# Patient Record
Sex: FEMALE | Race: WHITE | Employment: FULL TIME | URBAN - METROPOLITAN AREA
[De-identification: names, ages, dates, MRNs, and addresses within clinical notes are randomized per-mention and may not be internally consistent; named-entity substitution may affect disease eponyms.]

---

## 2017-08-18 ENCOUNTER — HOSPITAL ENCOUNTER (OUTPATIENT)
Dept: MAMMOGRAPHY | Age: 57
Discharge: HOME | End: 2017-08-18
Attending: OBSTETRICS & GYNECOLOGY
Payer: COMMERCIAL

## 2017-08-18 DIAGNOSIS — Z12.31 VISIT FOR SCREENING MAMMOGRAM: ICD-10-CM

## 2017-08-18 PROCEDURE — 77067 SCR MAMMO BI INCL CAD: CPT

## 2019-01-15 ENCOUNTER — HOSPITAL ENCOUNTER (OUTPATIENT)
Dept: MAMMOGRAPHY | Age: 59
Discharge: HOME | End: 2019-01-15
Attending: OBSTETRICS & GYNECOLOGY
Payer: COMMERCIAL

## 2019-01-15 DIAGNOSIS — Z12.39 SCREENING BREAST EXAMINATION: ICD-10-CM

## 2019-01-15 PROCEDURE — 77063 BREAST TOMOSYNTHESIS BI: CPT

## 2019-07-25 LAB — CREATININE, EXTERNAL: 0.66

## 2019-11-05 ENCOUNTER — OFFICE VISIT (OUTPATIENT)
Dept: ENDOCRINOLOGY | Age: 59
End: 2019-11-05

## 2019-11-05 VITALS
WEIGHT: 191.6 LBS | HEART RATE: 71 BPM | BODY MASS INDEX: 32.71 KG/M2 | HEIGHT: 64 IN | SYSTOLIC BLOOD PRESSURE: 120 MMHG | DIASTOLIC BLOOD PRESSURE: 74 MMHG

## 2019-11-05 DIAGNOSIS — E03.9 HYPOTHYROIDISM, UNSPECIFIED TYPE: Primary | ICD-10-CM

## 2019-11-05 RX ORDER — SUMATRIPTAN 50 MG/1
50 TABLET, FILM COATED ORAL
COMMUNITY

## 2019-11-05 RX ORDER — CHOLECALCIFEROL (VITAMIN D3) 125 MCG
5000 CAPSULE ORAL
COMMUNITY

## 2019-11-05 RX ORDER — SULFAMETHOXAZOLE AND TRIMETHOPRIM 800; 160 MG/1; MG/1
1 TABLET ORAL DAILY
Refills: 0 | COMMUNITY
Start: 2019-10-04

## 2019-11-05 RX ORDER — FLUTICASONE PROPIONATE 50 MCG
1 SPRAY, SUSPENSION (ML) NASAL
COMMUNITY

## 2019-11-05 RX ORDER — EMOLLIENT COMBINATION NO.43
CREAM (GRAM) TOPICAL
Refills: 0 | COMMUNITY
Start: 2019-09-11

## 2019-11-05 RX ORDER — ALPRAZOLAM 0.25 MG/1
0.25 TABLET ORAL
COMMUNITY

## 2019-11-05 RX ORDER — DICYCLOMINE HYDROCHLORIDE 10 MG/1
1 CAPSULE ORAL
COMMUNITY

## 2019-11-05 RX ORDER — LORATADINE 10 MG/1
10 TABLET ORAL
COMMUNITY

## 2019-11-05 RX ORDER — ESCITALOPRAM OXALATE 20 MG/1
1 TABLET ORAL DAILY
Refills: 0 | COMMUNITY
Start: 2019-09-03

## 2019-11-05 NOTE — PROGRESS NOTES
CONSULTATION REQUESTED BY: Celestina Kimbrough NP     REASON FOR CONSULT: Hypothyroidism    CHIEF COMPLAINT: Evaluation for hypothyroidism    HISTORY OF PRESENT ILLNESS:   Marlon Luu is a 64 y.o. female with a PMHx as noted below who was referred to our endocrinology clinic for evaluation of hypothyroidism. Thyroid History:  Diagnosed just over 5 years ago,  Patient denies history of radiation exposure or trauma/surgery,  Family history of thyroid disease is +,   Currently taking armour thyroid 60mg once daily,   The patient admits to taking it together with her other supplements/meds in the AM,  Denies history of osteoporosis or heart issues,  Patient reports occasional palpitations. Steady weight,    Ultrasound: 9/19/18:   Right lobe 4.1 cm  Left lobe 3.9 cm  subcentimeter nodules (side not described)  \"no nodules that warrant FNA or ultrasound follow up per ACR TI-RADS\",     Outside labs reviewed: 7/25/19:   TSH 1.9  FT4 0.66  On 60mg armour thyroid daily    PAST MEDICAL/SURGICAL HISTORY:   No past medical history on file. Past Surgical History:   Procedure Laterality Date    HX BREAST BIOPSY Right     benign cyst removed in the 80s or 90s       ALLERGIES:   Not on File    MEDICATIONS ON ADMISSION:     Current Outpatient Medications:     ARMOUR THYROID 60 mg tablet, Take 1 Tab by mouth daily. , Disp: , Rfl: 0    escitalopram oxalate (LEXAPRO) 20 mg tablet, Take 1 Tab by mouth daily. , Disp: , Rfl: 0    cholecalciferol (VITAMIN D3) 5,000 unit capsule, Take 5,000 Units by mouth., Disp: , Rfl:     loratadine (CLARITIN) 10 mg tablet, Take 10 mg by mouth., Disp: , Rfl:     fluticasone propionate (FLONASE) 50 mcg/actuation nasal spray, 1 Parks by Nasal route., Disp: , Rfl:     Lactobacillus acidophilus 10 billion cell cap, Take 1 Cap by mouth daily. , Disp: , Rfl:     ALPRAZolam (XANAX) 0.25 mg tablet, Take 0.25 mg by mouth., Disp: , Rfl:     dicyclomine (BENTYL) 10 mg capsule, Take 1 Tab by mouth., Disp: , Rfl:     SUMAtriptan (IMITREX) 50 mg tablet, Take 50 mg by mouth., Disp: , Rfl:     PROMISEB crea, apply as directed TO PERINASAL AREA TWICE DAILY, Disp: , Rfl: 0    MELATONIN PO, Take 1 Tab by mouth daily. , Disp: , Rfl:     SOCIAL HISTORY:   Social History     Socioeconomic History    Marital status:      Spouse name: Not on file    Number of children: Not on file    Years of education: Not on file    Highest education level: Not on file   Occupational History    Not on file   Social Needs    Financial resource strain: Not on file    Food insecurity:     Worry: Not on file     Inability: Not on file    Transportation needs:     Medical: Not on file     Non-medical: Not on file   Tobacco Use    Smoking status: Never Smoker    Smokeless tobacco: Never Used   Substance and Sexual Activity    Alcohol use: Not on file    Drug use: Not on file    Sexual activity: Not on file   Lifestyle    Physical activity:     Days per week: Not on file     Minutes per session: Not on file    Stress: Not on file   Relationships    Social connections:     Talks on phone: Not on file     Gets together: Not on file     Attends Holiness service: Not on file     Active member of club or organization: Not on file     Attends meetings of clubs or organizations: Not on file     Relationship status: Not on file    Intimate partner violence:     Fear of current or ex partner: Not on file     Emotionally abused: Not on file     Physically abused: Not on file     Forced sexual activity: Not on file   Other Topics Concern    Not on file   Social History Narrative    Not on file       FAMILY HISTORY:  Family History   Problem Relation Age of Onset    Breast Cancer Paternal Aunt 48       REVIEW OF SYSTEMS: Complete ROS assessed and noted for that which is described above, all else are negative.   Eyes: normal  ENT: normal  CVS: normal  Resp: normal  GI: normal  : normal  GYN: normal  Endocrine: normal  Integument: normal  Musculoskeletal: normal  Neuro: normal  Psych: normal      PHYSICAL EXAMINATION:    VITAL SIGNS:  Visit Vitals  /74 (BP 1 Location: Left arm, BP Patient Position: Sitting)   Pulse 71   Ht 5' 4\" (1.626 m)   Wt 191 lb 9.6 oz (86.9 kg)   BMI 32.89 kg/m²       GENERAL: NCAT, Sitting comfortably, NAD  EYES: EOMI, non-icteric, no proptosis  Ear/Nose/Throat: NCAT, no inflammation, no masses, thyroid gland is not enlarged  LYMPH NODES: No LAD  CARDIOVASCULAR: S1 S2, RRR, No murmur, 2+ radial pulses  RESPIRATORY: CTA b/l, no wheeze/rales  GASTROINTESTINAL: ND  MUSCULOSKELETAL: Normal ROM, no atrophy  SKIN: warm, no edema/rash/ or other skin changes  NEUROLOGIC: 5/5 power all extremities, no tremor, AAOx3  PSYCHIATRIC: Normal affect, Normal insight and judgement     REVIEW OF LABORATORY AND RADIOLOGY DATA:   Labs and documentation have been reviewed as described above. ASSESSMENT AND PLAN:   Frankey Fanny is a 64 y.o. female with a PMHx as noted above who was referred to our endocrinology clinic for evaluation of hypothyroidism. Hypothyroidism    Today we have noted that they were not taking the thyroid tabs correctly, and this results in variable absorption of the tablet, resulting in fluctuating thyroid blood levels and thus fluctuating lab results. We discussed the correct way to take thyroid hormone tablets and noted that once they start taking it correctly, it may result in symptoms of hyperthyroidism and necessitate a dose reduction. The patient demonstrated understanding of this point and will start taking it correctly. They will advise me if they are having symptoms which will prompt a sooner dose reduction prior to next visit. Also, if labs today suggest , we will go ahead and reduce their dose if appropriate after results are reviewed.      We did note the risks of low bone density and heart arrhythmias with medications such as armour thyroid compared with alternate thyroid hormone replacement which contains only T4 due to the high T3 content. No role in thyroid levels today as her levels will not reflect her dose considering she is not taking it correctly. Her levels today would thus not guide any changes in therapy. However after taking her thyroid hormone correctly for 6-8 weeks she should then repeat her levels to see her true TSH on this dose. Can continue with 60 mg armour thyroid each morning,  Discussed the best way to take thyroid hormone each morning. Patient noted for surgical menopause in 2006. Physiologic menopause later. Estrogen low as expected. Discussed symptoms and the overall process with her today per her review of her outside labs. 60 minutes spent together with patient today of which >50% of this time was spent in counseling and coordination of care. Patient reports her intentions are to have clinical evaluation today, however to follow up with her PCP for routine thyroid evaluation. This is ok with me and she is however welcome back in the future if desired. Chiara Bertrand.  [address] [facility]

## 2019-11-05 NOTE — PATIENT INSTRUCTIONS
Can continue with 60 mg armour thyroid each morning,    Remember to take thyroid hormone with a glass of water only, on an empty stomach each morning, 1 hour prior to ingesting any other medications, including vitamins, food, coffee, tea, juice or any other beverages. All of these can reduce the absorption of thyroid hormone tablets and result in fluctuating levels in the blood and on labs, affecting also how one feels. Welcome back in the future as per your preferences,    Bryant Shrestha.  [address] [facility]  [address] [place] [address]

## 2019-12-10 ENCOUNTER — DOCUMENTATION ONLY (OUTPATIENT)
Dept: ENDOCRINOLOGY | Age: 59
End: 2019-12-10

## 2019-12-10 NOTE — PROGRESS NOTES
Outside lab  12/6/19:   TSH 0.08  FT4 0.81  On armour thyroid, had started taking correctly  She will need a dose reduction  Patient had advised she preferred to follow up for dose adjustment with her primary care team  I don't recommend armour thyroid at her age with increasing risk of osteoporosis over time,  She is welcome back however as needed    Hussain Cavanaugh.  [address] [facility]  [address] [place] [address]

## 2022-11-07 ENCOUNTER — TELEPHONE (OUTPATIENT)
Dept: ENDOCRINOLOGY | Age: 62
End: 2022-11-07

## 2022-11-07 NOTE — TELEPHONE ENCOUNTER
11/7/2022  4:30 PM    Zo Peraza from [address] [place] [address] called and stated they need office notes faxed to their office from when the pt was seeing Dr. Warren Mcelroy. They can be reached at [phone] and fax# [phone] Attn: Zo Peraza.     Thanks,   Jewell Spencer